# Patient Record
Sex: FEMALE | Race: WHITE | NOT HISPANIC OR LATINO | Employment: OTHER | URBAN - METROPOLITAN AREA
[De-identification: names, ages, dates, MRNs, and addresses within clinical notes are randomized per-mention and may not be internally consistent; named-entity substitution may affect disease eponyms.]

---

## 2019-05-21 LAB — CYTOLOGY: NORMAL

## 2019-05-23 PROBLEM — Z30.017 NEXPLANON INSERTION: Status: ACTIVE | Noted: 2019-05-23

## 2019-05-23 PROBLEM — Z98.890 S/P LAPAROSCOPY: Status: ACTIVE | Noted: 2019-05-23

## 2019-05-23 PROBLEM — F31.9 BIPOLAR DISORDER: Status: ACTIVE | Noted: 2019-05-23

## 2019-05-23 PROBLEM — O00.90 ECTOPIC PREGNANCY: Status: ACTIVE | Noted: 2019-05-07

## 2019-08-22 PROBLEM — Z11.3 SCREEN FOR STD (SEXUALLY TRANSMITTED DISEASE): Status: ACTIVE | Noted: 2019-08-22

## 2021-03-11 PROBLEM — Z30.017 NEXPLANON INSERTION: Status: RESOLVED | Noted: 2019-05-23 | Resolved: 2021-03-11

## 2021-03-11 PROBLEM — Z11.3 SCREEN FOR STD (SEXUALLY TRANSMITTED DISEASE): Status: RESOLVED | Noted: 2019-08-22 | Resolved: 2021-03-11

## 2021-05-12 ENCOUNTER — PATIENT MESSAGE (OUTPATIENT)
Dept: RESEARCH | Facility: HOSPITAL | Age: 31
End: 2021-05-12

## 2022-04-12 ENCOUNTER — SOCIAL WORK (OUTPATIENT)
Dept: ADMINISTRATIVE | Facility: OTHER | Age: 32
End: 2022-04-12

## 2022-04-12 NOTE — PROGRESS NOTES
SW met with pt regarding initial OB assessment. Pt stated this is her 5th pregnancy/1-ectopia. Pt stated lives alone with her children-12,8,6 and able to perform ADL's independently. Pt stated does not work is disabled. Pt stated support system is her sister/Marcelina. Pt stated has medicare and medicaid([employer]). Pt stated does not have WIC. Pt stated not going to breastfeed. SW provide pt with information on other community resources.KIMO faxed and scanned pt's notification of pregnancy into epic.  No other needs identified at this time.    DIANNA Myers  Pager#[phone]

## 2022-05-23 PROBLEM — A59.01 TRICHOMONAL VAGINITIS DURING PREGNANCY, ANTEPARTUM: Status: ACTIVE | Noted: 2022-05-23

## 2022-05-23 PROBLEM — O23.599 TRICHOMONAL VAGINITIS DURING PREGNANCY, ANTEPARTUM: Status: ACTIVE | Noted: 2022-05-23

## 2022-05-23 PROBLEM — O09.90 SUPERVISION OF HIGH RISK PREGNANCY, ANTEPARTUM: Status: ACTIVE | Noted: 2022-05-23

## 2022-08-22 ENCOUNTER — PATIENT MESSAGE (OUTPATIENT)
Dept: ADMINISTRATIVE | Facility: OTHER | Age: 32
End: 2022-08-22

## 2022-08-24 PROBLEM — O99.810 ABNORMAL O'SULLIVAN GLUCOSE CHALLENGE TEST, ANTEPARTUM: Status: ACTIVE | Noted: 2022-08-24

## 2022-08-30 ENCOUNTER — SOCIAL WORK (OUTPATIENT)
Dept: ADMINISTRATIVE | Facility: OTHER | Age: 32
End: 2022-08-30

## 2022-08-30 PROBLEM — O24.419 GESTATIONAL DIABETES MELLITUS (GDM) IN SECOND TRIMESTER: Status: ACTIVE | Noted: 2022-08-24

## 2022-08-30 NOTE — PROGRESS NOTES
KIMO met with pt;per pt has a dental appointment on Friday(09/02/2022) and need a referral form from MD to get dental services. KIMO met with  informed him pt is here for a referral form to take to dental appointment on Friday(09/02/2022).  completed the referral form and SW provide pt with the original form. KIMO scanned form into epic. No other needs identified at this time.    DIANNA Myers  Pager#[phone]

## 2022-10-17 PROBLEM — R10.9 ABDOMINAL PAIN DURING PREGNANCY IN THIRD TRIMESTER: Status: ACTIVE | Noted: 2022-10-17

## 2022-10-17 PROBLEM — O36.8130 DECREASED FETAL MOVEMENTS IN THIRD TRIMESTER: Status: ACTIVE | Noted: 2022-10-17

## 2022-10-17 PROBLEM — O26.893 ABDOMINAL PAIN DURING PREGNANCY IN THIRD TRIMESTER: Status: ACTIVE | Noted: 2022-10-17

## 2022-10-17 PROBLEM — Z34.93 ENCOUNTER FOR SUPERVISION OF LOW-RISK PREGNANCY IN THIRD TRIMESTER: Status: ACTIVE | Noted: 2022-10-17

## 2022-10-17 PROBLEM — N89.8 VAGINAL DISCHARGE DURING PREGNANCY IN THIRD TRIMESTER: Status: ACTIVE | Noted: 2022-10-17

## 2022-10-17 PROBLEM — O24.419 GESTATIONAL DIABETES MELLITUS (GDM) IN THIRD TRIMESTER: Status: ACTIVE | Noted: 2022-10-17

## 2022-10-17 PROBLEM — O26.893 VAGINAL DISCHARGE DURING PREGNANCY IN THIRD TRIMESTER: Status: ACTIVE | Noted: 2022-10-17

## 2022-10-17 PROBLEM — Z34.93 ENCOUNTER FOR SUPERVISION OF LOW-RISK PREGNANCY IN THIRD TRIMESTER: Status: RESOLVED | Noted: 2022-10-17 | Resolved: 2022-10-17

## 2022-10-17 PROBLEM — R19.7 DIARRHEA: Status: ACTIVE | Noted: 2022-10-17

## 2022-11-07 PROBLEM — R10.9 ABDOMINAL PAIN DURING PREGNANCY IN THIRD TRIMESTER: Status: RESOLVED | Noted: 2022-10-17 | Resolved: 2022-11-07

## 2022-11-07 PROBLEM — O36.60X0 LGA (LARGE FOR GESTATIONAL AGE) FETUS AFFECTING MANAGEMENT OF MOTHER: Status: ACTIVE | Noted: 2022-11-07

## 2022-11-07 PROBLEM — O00.90 ECTOPIC PREGNANCY: Status: RESOLVED | Noted: 2019-05-07 | Resolved: 2022-11-07

## 2022-11-07 PROBLEM — O26.893 ABDOMINAL PAIN DURING PREGNANCY IN THIRD TRIMESTER: Status: RESOLVED | Noted: 2022-10-17 | Resolved: 2022-11-07

## 2022-11-07 PROBLEM — O24.419 GESTATIONAL DIABETES MELLITUS (GDM) IN SECOND TRIMESTER: Status: RESOLVED | Noted: 2022-08-24 | Resolved: 2022-11-07

## 2022-11-07 PROBLEM — O26.893 VAGINAL DISCHARGE DURING PREGNANCY IN THIRD TRIMESTER: Status: RESOLVED | Noted: 2022-10-17 | Resolved: 2022-11-07

## 2022-11-07 PROBLEM — N89.8 VAGINAL DISCHARGE DURING PREGNANCY IN THIRD TRIMESTER: Status: RESOLVED | Noted: 2022-10-17 | Resolved: 2022-11-07

## 2022-11-08 PROBLEM — O24.415 GESTATIONAL DIABETES MELLITUS (GDM) IN SECOND TRIMESTER CONTROLLED ON ORAL HYPOGLYCEMIC DRUG: Status: ACTIVE | Noted: 2022-11-08

## 2022-11-08 PROBLEM — O09.93 SUPERVISION OF HIGH RISK PREGNANCY IN THIRD TRIMESTER: Status: ACTIVE | Noted: 2022-11-08

## 2022-11-08 PROBLEM — D64.9 ANEMIA: Status: ACTIVE | Noted: 2022-11-08

## 2022-11-25 PROBLEM — R10.2 PELVIC PRESSURE IN PREGNANCY: Status: ACTIVE | Noted: 2022-11-25

## 2022-11-25 PROBLEM — O26.899 PELVIC PRESSURE IN PREGNANCY: Status: ACTIVE | Noted: 2022-11-25

## 2022-11-26 PROBLEM — Z30.09 UNWANTED FERTILITY: Status: ACTIVE | Noted: 2022-11-26

## 2023-01-16 PROBLEM — O36.8130 DECREASED FETAL MOVEMENTS IN THIRD TRIMESTER: Status: RESOLVED | Noted: 2022-10-17 | Resolved: 2023-01-16

## 2023-02-10 PROBLEM — Z87.59 HISTORY OF ECTOPIC PREGNANCY: Status: ACTIVE | Noted: 2023-02-10

## 2023-02-10 PROBLEM — Z90.79 STATUS POST BILATERAL SALPINGECTOMY: Status: ACTIVE | Noted: 2023-02-10
